# Patient Record
Sex: MALE | Race: BLACK OR AFRICAN AMERICAN
[De-identification: names, ages, dates, MRNs, and addresses within clinical notes are randomized per-mention and may not be internally consistent; named-entity substitution may affect disease eponyms.]

---

## 2018-05-15 ENCOUNTER — HOSPITAL ENCOUNTER (EMERGENCY)
Dept: HOSPITAL 62 - ER | Age: 23
Discharge: HOME | End: 2018-05-15
Payer: MEDICAID

## 2018-05-15 VITALS — SYSTOLIC BLOOD PRESSURE: 114 MMHG | DIASTOLIC BLOOD PRESSURE: 59 MMHG

## 2018-05-15 DIAGNOSIS — R11.0: ICD-10-CM

## 2018-05-15 DIAGNOSIS — R10.13: Primary | ICD-10-CM

## 2018-05-15 DIAGNOSIS — R10.33: ICD-10-CM

## 2018-05-15 LAB
ADD MANUAL DIFF: NO
ALBUMIN SERPL-MCNC: 4.4 G/DL (ref 3.5–5)
ALP SERPL-CCNC: 117 U/L (ref 38–126)
ALT SERPL-CCNC: 25 U/L (ref 21–72)
ANION GAP SERPL CALC-SCNC: 14 MMOL/L (ref 5–19)
APPEARANCE UR: CLEAR
APTT PPP: YELLOW S
AST SERPL-CCNC: 22 U/L (ref 17–59)
BASOPHILS # BLD AUTO: 0.1 10^3/UL (ref 0–0.2)
BASOPHILS NFR BLD AUTO: 1.1 % (ref 0–2)
BILIRUB DIRECT SERPL-MCNC: 0.2 MG/DL (ref 0–0.4)
BILIRUB SERPL-MCNC: 0.2 MG/DL (ref 0.2–1.3)
BILIRUB UR QL STRIP: NEGATIVE
BUN SERPL-MCNC: 10 MG/DL (ref 7–20)
CALCIUM: 9.7 MG/DL (ref 8.4–10.2)
CHLORIDE SERPL-SCNC: 104 MMOL/L (ref 98–107)
CO2 SERPL-SCNC: 27 MMOL/L (ref 22–30)
EOSINOPHIL # BLD AUTO: 0.1 10^3/UL (ref 0–0.6)
EOSINOPHIL NFR BLD AUTO: 2.7 % (ref 0–6)
ERYTHROCYTE [DISTWIDTH] IN BLOOD BY AUTOMATED COUNT: 13.2 % (ref 11.5–14)
GLUCOSE SERPL-MCNC: 85 MG/DL (ref 75–110)
GLUCOSE UR STRIP-MCNC: NEGATIVE MG/DL
HCT VFR BLD CALC: 45.5 % (ref 37.9–51)
HGB BLD-MCNC: 15.2 G/DL (ref 13.5–17)
KETONES UR STRIP-MCNC: NEGATIVE MG/DL
LIPASE SERPL-CCNC: 65.6 U/L (ref 23–300)
LYMPHOCYTES # BLD AUTO: 2.3 10^3/UL (ref 0.5–4.7)
LYMPHOCYTES NFR BLD AUTO: 43.3 % (ref 13–45)
MCH RBC QN AUTO: 25.9 PG (ref 27–33.4)
MCHC RBC AUTO-ENTMCNC: 33.4 G/DL (ref 32–36)
MCV RBC AUTO: 78 FL (ref 80–97)
MONOCYTES # BLD AUTO: 0.4 10^3/UL (ref 0.1–1.4)
MONOCYTES NFR BLD AUTO: 7 % (ref 3–13)
NEUTROPHILS # BLD AUTO: 2.4 10^3/UL (ref 1.7–8.2)
NEUTS SEG NFR BLD AUTO: 45.9 % (ref 42–78)
NITRITE UR QL STRIP: NEGATIVE
PH UR STRIP: 7 [PH] (ref 5–9)
PLATELET # BLD: 245 10^3/UL (ref 150–450)
POTASSIUM SERPL-SCNC: 3.8 MMOL/L (ref 3.6–5)
PROT SERPL-MCNC: 7.3 G/DL (ref 6.3–8.2)
PROT UR STRIP-MCNC: NEGATIVE MG/DL
RBC # BLD AUTO: 5.87 10^6/UL (ref 4.35–5.55)
SODIUM SERPL-SCNC: 145.1 MMOL/L (ref 137–145)
SP GR UR STRIP: 1.02
TOTAL CELLS COUNTED % (AUTO): 100 %
UROBILINOGEN UR-MCNC: 2 MG/DL (ref ?–2)
WBC # BLD AUTO: 5.3 10^3/UL (ref 4–10.5)

## 2018-05-15 PROCEDURE — 81001 URINALYSIS AUTO W/SCOPE: CPT

## 2018-05-15 PROCEDURE — 93005 ELECTROCARDIOGRAM TRACING: CPT

## 2018-05-15 PROCEDURE — 99284 EMERGENCY DEPT VISIT MOD MDM: CPT

## 2018-05-15 PROCEDURE — S0028 INJECTION, FAMOTIDINE, 20 MG: HCPCS

## 2018-05-15 PROCEDURE — 85025 COMPLETE CBC W/AUTO DIFF WBC: CPT

## 2018-05-15 PROCEDURE — 80053 COMPREHEN METABOLIC PANEL: CPT

## 2018-05-15 PROCEDURE — 96361 HYDRATE IV INFUSION ADD-ON: CPT

## 2018-05-15 PROCEDURE — 96375 TX/PRO/DX INJ NEW DRUG ADDON: CPT

## 2018-05-15 PROCEDURE — 36415 COLL VENOUS BLD VENIPUNCTURE: CPT

## 2018-05-15 PROCEDURE — 83690 ASSAY OF LIPASE: CPT

## 2018-05-15 PROCEDURE — 93010 ELECTROCARDIOGRAM REPORT: CPT

## 2018-05-15 PROCEDURE — 96374 THER/PROPH/DIAG INJ IV PUSH: CPT

## 2018-05-15 NOTE — ER DOCUMENT REPORT
ED GI/





- General


Chief Complaint: Abdominal Pain


Stated Complaint: ABDOMINAL PAIN


Time Seen by Provider: 05/15/18 14:33


Notes: 


The patient is a 23-year-old male who presents with 2 days of worsening 

abdominal pain and nausea.  Initially said the pain started periumbilical and 

is now in his epigastric region.  He is also having substernal chest pain that 

feels like a burning sensation.  Only surgery is an inguinal hernia repair. He 

denies vomiting, diarrhea, constipation, urinary symptoms, flank pain, 

shortness of breath or abdominal distention.


TRAVEL OUTSIDE OF THE [place] IN LAST 30 DAYS: No





- Related Data


Allergies/Adverse Reactions: 


 





No Known Allergies Allergy (Unverified 05/15/18 16:07)


 











Past Medical History





- General


Information source: Patient





- Social History


Smoking Status: Unknown if Ever Smoked


Family History: Reviewed & Not Pertinent





Review of Systems





- Review of Systems


Notes: 


REVIEW OF SYSTEMS:


CONSTITUTIONAL: -fevers, -chills


EENT: -eye pain, -difficulty swallowing, -nasal congestion


CARDIOVASCULAR: +substernal chest burning, -syncope.


RESPIRATORY: -cough, -SOB


GASTROINTESTINAL: +abdominal pain, +nausea, -vomiting, -diarrhea


GENITOURINARY: -dysuria, -hematuria


MUSCULOSKELETAL: -back pain, -neck pain


SKIN: -rash or skin lesions.


HEMATOLOGIC: -easy bruising or bleeding.


LYMPHATIC: -swollen, enlarged glands.


NEUROLOGICAL: -altered mental status or loss of consciousness, -headache, -

neurologic symptoms


PSYCHIATRIC: -anxiety, -depression.


ALL OTHER SYSTEMS REVIEWED AND NEGATIVE.





Physical Exam





- Vital signs


Vitals: 


 











Temp Pulse BP Pulse Ox


 


 97.8 F   68   122/68   97 


 


 05/15/18 14:34  05/15/18 14:34  05/15/18 14:34  05/15/18 14:34














- Notes


Notes: 


PHYSICAL EXAMINATION:


GENERAL: Well-appearing, well-nourished and in no acute distress.


HEAD: Atraumatic, normocephalic.


EYES: Pupils equal round and reactive to light, extraocular movements intact, 

sclera anicteric, conjunctiva are normal.


ENT: nares patent, oropharynx clear without exudates.  Moist mucous membranes.


NECK: Normal range of motion, supple without lymphadenopathy


LUNGS: Breath sounds clear to auscultation bilaterally and equal.  No wheezes 

rales or rhonchi.


HEART: Regular rate and rhythm without murmurs


ABDOMEN: Soft, mild periumbilical and epigastric tenderness, normoactive bowel 

sounds.  No guarding, no rebound.  No masses appreciated.


EXTREMITIES: Normal range of motion, no pitting or edema.  No cyanosis.


NEUROLOGICAL: Cranial nerves grossly intact.  Normal speech, normal gait.  

Normal sensory and motor exams.


PSYCH: Normal mood, normal affect.


SKIN: Warm, Dry, normal turgor, no rashes or lesions noted.





Course





- Re-evaluation


Re-evalutation: 


Patient with epigastric pain.  He has an inconsistent exam and had mild RLQ 

tenderness initially, so concerned about appendicitis.  However, before he 

obtained his CT scan, he said he was feeling much better and he deferred the CT 

scan at this time.  Repeat abdominal exam is completely nontender and he feels 

much better.  Blood work is also unremarkable.  Given very strict return 

precautions, especially about appendicitis, and he understands.





- Vital Signs


Vital signs: 


 











Temp Pulse Resp BP Pulse Ox


 


 97.9 F   63   18   114/59 L  99 


 


 05/15/18 17:13  05/15/18 17:13  05/15/18 17:13  05/15/18 17:13  05/15/18 17:13














- Laboratory


Result Diagrams: 


 05/15/18 14:47





 05/15/18 14:47


Laboratory results interpreted by me: 


 











  05/15/18 05/15/18 05/15/18





  14:47 14:47 15:21


 


RBC  5.87 H  


 


MCV  78 L  


 


MCH  25.9 L  


 


Sodium   145.1 H 


 


Urine Urobilinogen    2.0 H














Discharge





- Discharge


Clinical Impression: 


 Epigastric abdominal pain





Condition: Stable


Disposition: HOME, SELF-CARE


Instructions:  Observation for Appendicitis ([facility])


Additional Instructions: 


ABDOMINAL PAIN:





     There are many causes of abdominal pain.  Pain can mean a serious problem 

requiring surgery (such as appendicitis). It can also be an innocent problem 

that goes away on its own (such as a viral infection).  Often, time must pass 

to determine the cause of pain.


     The physician does not feel that hospitalization is necessary, at present. 

Things may change within the next 24 hours. Call the doctor or come back for re-

examination if any problems occur, such as:


     (1) Pain that becomes more severe, steady, or becomes concentrated in one 

specific area.  Also, pain that is more severe with movement or coughing.  


     (2) Vomiting that persists or becomes more frequent.  


     (3) Blood in the vomitus, urine, or bowel movements.  Blood in the stool 

may have a tarry or black appearance.


     (4) Shaking chills or fever greater than 100 degrees F. 


     (5) The abdomen becomes more distended or swollen. 


     (6) Bowel movements cease. 


     (7) Failure to improve as expected.








NORMAL EXAM AND WORKUP:


     At this time, your examination and workup show no significant abnormality.

  No significant abnormal physical findings are noted.  All laboratory, EKG, 

and imaging (x-ray, CT scans, ultrasound) studies that were ordered show no 

significant abnormality.


     Although your examination and all studies that were ordered showed no 

significant abnormal finding, there are no examinations and no studies that are 

100% accurate.  There is always the possibility that some abnormality could 

exist and not be detected with physical examination or within the limits and 

capabilities of laboratory and other studies.


     You should return or follow up as you were instructed on your visit today 

for further evaluation if your symptoms do not resolve.








FOLLOW-UP CARE:


If you have been referred to a physician for follow-up care, call the physician

s office for an appointment as you were instructed or within the next two days.

  If you experience worsening or a significant change in your symptoms, notify 

the physician immediately or return to the Emergency Department at any time for 

re-evaluation.





Gastritis





     You have an inflammation of the stomach called gastritis.  This commonly 

causes upper abdominal pain, nausea, and vomiting.  In severe cases, bleeding 

of the stomach lining can occur.  Gastritis can be caused by bacteria or viruses

, alcohol, or stomach-irritating drugs.


     Begin with sips of clear liquids.  Take increasing amounts of fluid over 

the first 24 hours.  Then start small amounts of bland foods (such as dry toast

, applesauce, mashed potato).  Gradually resume your usual diet.


     You should take antacids every two hours until the pain has subsided.  Acid

-suppressing drugs may be prescribed as well.  Avoid aspirin, caffeine, tobacco

, and alcohol.


     If the abdominal pain worsens, or there is evidence of major bleeding in 

the stomach (such as black, tarry stool, bloody or black vomit, or 

lightheadedness), you should return immediately.  Call the doctor if you aren't 

improved in 24 to 36 hours.





Prescriptions: 


Famotidine [Pepcid 20 mg Tablet] 20 mg PO BID #12 tablet


Referrals: 


[facility] [Outside] - Follow up as needed

## 2018-05-22 ENCOUNTER — HOSPITAL ENCOUNTER (EMERGENCY)
Dept: HOSPITAL 62 - ER | Age: 23
Discharge: LEFT BEFORE DISCHARGE | End: 2018-05-22
Payer: MEDICAID

## 2018-05-22 VITALS — DIASTOLIC BLOOD PRESSURE: 71 MMHG | SYSTOLIC BLOOD PRESSURE: 123 MMHG

## 2018-05-22 DIAGNOSIS — R00.2: Primary | ICD-10-CM

## 2018-05-22 LAB
ADD MANUAL DIFF: NO
ALBUMIN SERPL-MCNC: 4.7 G/DL (ref 3.5–5)
ALP SERPL-CCNC: 102 U/L (ref 38–126)
ALT SERPL-CCNC: 25 U/L (ref 21–72)
ANION GAP SERPL CALC-SCNC: 14 MMOL/L (ref 5–19)
AST SERPL-CCNC: 29 U/L (ref 17–59)
BASOPHILS # BLD AUTO: 0.1 10^3/UL (ref 0–0.2)
BASOPHILS NFR BLD AUTO: 1.2 % (ref 0–2)
BILIRUB DIRECT SERPL-MCNC: 0.3 MG/DL (ref 0–0.4)
BILIRUB SERPL-MCNC: 0.4 MG/DL (ref 0.2–1.3)
BUN SERPL-MCNC: 12 MG/DL (ref 7–20)
CALCIUM: 9.9 MG/DL (ref 8.4–10.2)
CHLORIDE SERPL-SCNC: 108 MMOL/L (ref 98–107)
CO2 SERPL-SCNC: 24 MMOL/L (ref 22–30)
EOSINOPHIL # BLD AUTO: 0.1 10^3/UL (ref 0–0.6)
EOSINOPHIL NFR BLD AUTO: 2.7 % (ref 0–6)
ERYTHROCYTE [DISTWIDTH] IN BLOOD BY AUTOMATED COUNT: 13.7 % (ref 11.5–14)
GLUCOSE SERPL-MCNC: 91 MG/DL (ref 75–110)
HCT VFR BLD CALC: 46 % (ref 37.9–51)
HGB BLD-MCNC: 15.3 G/DL (ref 13.5–17)
LYMPHOCYTES # BLD AUTO: 2.2 10^3/UL (ref 0.5–4.7)
LYMPHOCYTES NFR BLD AUTO: 40.7 % (ref 13–45)
MCH RBC QN AUTO: 26 PG (ref 27–33.4)
MCHC RBC AUTO-ENTMCNC: 33.4 G/DL (ref 32–36)
MCV RBC AUTO: 78 FL (ref 80–97)
MONOCYTES # BLD AUTO: 0.5 10^3/UL (ref 0.1–1.4)
MONOCYTES NFR BLD AUTO: 8.9 % (ref 3–13)
NEUTROPHILS # BLD AUTO: 2.5 10^3/UL (ref 1.7–8.2)
NEUTS SEG NFR BLD AUTO: 46.5 % (ref 42–78)
PHOSPHATE SERPL-MCNC: 4.7 MG/DL (ref 2.5–4.5)
PLATELET # BLD: 226 10^3/UL (ref 150–450)
POTASSIUM SERPL-SCNC: 3.9 MMOL/L (ref 3.6–5)
PROT SERPL-MCNC: 7.7 G/DL (ref 6.3–8.2)
RBC # BLD AUTO: 5.9 10^6/UL (ref 4.35–5.55)
SODIUM SERPL-SCNC: 145.9 MMOL/L (ref 137–145)
TOTAL CELLS COUNTED % (AUTO): 100 %
WBC # BLD AUTO: 5.4 10^3/UL (ref 4–10.5)

## 2018-05-22 PROCEDURE — 85025 COMPLETE CBC W/AUTO DIFF WBC: CPT

## 2018-05-22 PROCEDURE — 93005 ELECTROCARDIOGRAM TRACING: CPT

## 2018-05-22 PROCEDURE — 80053 COMPREHEN METABOLIC PANEL: CPT

## 2018-05-22 PROCEDURE — 93010 ELECTROCARDIOGRAM REPORT: CPT

## 2018-05-22 PROCEDURE — 84100 ASSAY OF PHOSPHORUS: CPT

## 2018-05-22 PROCEDURE — 36415 COLL VENOUS BLD VENIPUNCTURE: CPT

## 2018-05-22 PROCEDURE — 99281 EMR DPT VST MAYX REQ PHY/QHP: CPT

## 2018-05-22 PROCEDURE — 71046 X-RAY EXAM CHEST 2 VIEWS: CPT

## 2018-05-22 PROCEDURE — 84443 ASSAY THYROID STIM HORMONE: CPT

## 2018-05-22 PROCEDURE — 83735 ASSAY OF MAGNESIUM: CPT

## 2018-05-22 NOTE — RADIOLOGY REPORT (SQ)
EXAM DESCRIPTION:  CHEST 2 VIEWS



COMPLETED DATE/TIME:  5/22/2018 2:16 pm



REASON FOR STUDY:  SOB palpitations



COMPARISON:  February 2008



EXAM PARAMETERS:  NUMBER OF VIEWS: two views

TECHNIQUE: Digital Frontal and Lateral radiographic views of the chest acquired.

RADIATION DOSE: NA

LIMITATIONS: none



FINDINGS:  LUNGS AND PLEURA: No opacities, masses or pneumothorax. No pleural effusion.

MEDIASTINUM AND HILAR STRUCTURES: No masses or contour abnormalities.

HEART AND VASCULAR STRUCTURES: Heart normal size.  No evidence for failure.

BONES: No acute findings.

HARDWARE: None in the chest.

OTHER: No other significant finding.



IMPRESSION:  NO ACUTE RADIOGRAPHIC FINDING IN THE CHEST.



TECHNICAL DOCUMENTATION:  JOB ID:  [id number]

 2011 [email or web address]- All Rights Reserved



Reading location - IP/workstation name: [email or web address]

## 2018-05-22 NOTE — EKG REPORT
SEVERITY:- OTHERWISE NORMAL ECG -

SINUS RHYTHM

PACS

:

Confirmed by: Jacobo Garcia MD 22-May-2018 20:36:57

## 2018-05-22 NOTE — ER DOCUMENT REPORT
ED Medical Screen (RME)





- General


Chief Complaint: Palpitations


Stated Complaint: PALPITATIONS


Time Seen by Provider: 05/22/18 13:42


Notes: 





RAPID MEDICAL EVALUATION DISCLOSURE


I have seen this patient as part of a Rapid Medical Evaluation and, if 

applicable, placed any initially appropriate orders. The patient will be seen 

and fully evaluated, including a full history and physical exam, by a provider (

in Main ED or Fast Track) when a room becomes available.





------------------------------------------------------------------





23-year-old male here with complaints of palpitations that started earlier 

today.  They are intermittent lasting a minute or 2 before resolution.  He has 

had some shortness of breath with them but no chest pain/discomfort nausea 

vomiting lightheadedness.  He does not take any medications.  No new 

medications.  He denies any excessive caffeine or energy drink intake but does 

drink "a monster drink here and there".  Denies illicit drug use and alcohol.  

No history of thyroid issues.  Denies any previous cardiac disease.  Of note, 

he does report he has not been drinking fluids appropriately and states that 

the last time he had a bottle of water was sometime several days ago.





EXAM


CTAB


RRR


TRAVEL OUTSIDE OF THE [place] IN LAST 30 DAYS: No





- Related Data


Allergies/Adverse Reactions: 


 





No Known Allergies Allergy (Verified 05/22/18 13:25)


 











Past Medical History


Renal/ Medical History: Denies: Hx Peritoneal Dialysis





Physical Exam





- Vital signs


Vitals: 





 











Temp Pulse Resp BP Pulse Ox


 


 98.3 F   74   16   115/69   98 


 


 05/22/18 13:34  05/22/18 13:34  05/22/18 13:34  05/22/18 13:34  05/22/18 13:34














Course





- Vital Signs


Vital signs: 





 











Temp Pulse Resp BP Pulse Ox


 


 98.3 F   74   16   115/69   98 


 


 05/22/18 13:34  05/22/18 13:34  05/22/18 13:34  05/22/18 13:34  05/22/18 13:34

## 2018-05-30 ENCOUNTER — HOSPITAL ENCOUNTER (EMERGENCY)
Dept: HOSPITAL 62 - ER | Age: 23
LOS: 1 days | Discharge: HOME | End: 2018-05-31
Payer: MEDICAID

## 2018-05-30 DIAGNOSIS — F17.200: ICD-10-CM

## 2018-05-30 DIAGNOSIS — R00.2: Primary | ICD-10-CM

## 2018-05-30 DIAGNOSIS — Z59.0: ICD-10-CM

## 2018-05-30 DIAGNOSIS — R42: ICD-10-CM

## 2018-05-30 DIAGNOSIS — R06.02: ICD-10-CM

## 2018-05-30 PROCEDURE — 99406 BEHAV CHNG SMOKING 3-10 MIN: CPT

## 2018-05-30 PROCEDURE — 93010 ELECTROCARDIOGRAM REPORT: CPT

## 2018-05-30 PROCEDURE — 71046 X-RAY EXAM CHEST 2 VIEWS: CPT

## 2018-05-30 PROCEDURE — 99285 EMERGENCY DEPT VISIT HI MDM: CPT

## 2018-05-30 PROCEDURE — 93005 ELECTROCARDIOGRAM TRACING: CPT

## 2018-05-30 SDOH — ECONOMIC STABILITY - HOUSING INSECURITY: HOMELESSNESS: Z59.0

## 2018-05-31 VITALS — SYSTOLIC BLOOD PRESSURE: 119 MMHG | DIASTOLIC BLOOD PRESSURE: 61 MMHG

## 2018-05-31 NOTE — ER DOCUMENT REPORT
ED General





- General


Chief Complaint: Dizziness


Stated Complaint: DIZZYNESS/SHORT OF BREATH


Time Seen by Provider: 05/31/18 01:44


Mode of Arrival: Ambulatory


Information source: Patient, [facility] Records


Notes: 





23-year-old male with no reported past medical history presents with complaint 

of palpitations that have been ongoing for 1 week.  Patient states that he has 

been experiencing intermittent palpitations with minor activity.  He denies any 

chest pain, shortness of breath.  She was seen 1 week prior to arrival and had 

a full workup.  He does admit to increased stress and recently being homeless.  

He states that his family will not help him since he made the decision to be 

with his current girlfriend.  Patient was kicked out of the homeless shelter 

yesterday for not filling out a bus voucher.  Patient denies any recent 

illnesses, fever, chills, cough.  He denies any drug use.  He does admit to 

daily tobacco use.


TRAVEL OUTSIDE OF THE [place] IN LAST 30 DAYS: No





- HPI


Onset: Last week


Onset/Duration: Intermittent


Quality of pain: No pain


Severity: None


Associated symptoms: None, Vomiting.  denies: Chest pain, Nausea, Shortness of 

breath


Exacerbated by: Movement


Relieved by: Remaining still


Similar symptoms previously: Yes


Recently seen / treated by doctor: Yes





- Related Data


Allergies/Adverse Reactions: 


 





No Known Allergies Allergy (Verified 05/22/18 13:25)


 











Past Medical History





- General


Information source: Patient





- Social History


Smoking Status: Current Every Day Smoker


Smoking Education Provided: Yes - Patient counselled regarding cessation for 4 

minutes


Frequency of alcohol use: None


Drug Abuse: None


Lives with: Homeless, Other


Family History: Reviewed & Not Pertinent


Patient has suicidal ideation: No


Patient has homicidal ideation: No





- Medical History


Medical History: Negative


Renal/ Medical History: Denies: Hx Peritoneal Dialysis





Review of Systems





- Review of Systems


Notes: 





REVIEW OF SYSTEMS:


CONSTITUTIONAL :  Denies fever,  chills, or sweats.  Denies recent illness. 

Denies weight loss, recent hospitalizations. 


EENT: Denies visula changes, eye pain.    Denies nasal or sinus congestion or 

discharge.  Denies sore throat, oral lesions, difficulty swallowing.


CARDIOVASCULAR:  Denies chest pain.   Denies lower extremity edema.


RESPIRATORY:  Denies cough, cold, or chest congestion.  Denies shortness of 

breath, difficulty breathing, or wheezing.


GASTROINTESTINAL:  Denies abdominal pain or distention.  Denies nausea, vomiting

, or diarrhea.  Denies blood in vomitus, stools, or per rectum.  Denies black, 

tarry stools.  Denies constipation.  


GENITOURINARY:  Denies difficulty urinating, painful urination, burning, 

frequency, blood in urine, or  vaginal discharge.


MUSCULOSKELETAL:  Denies back or neck pain or stiffness.  Denies joint pain or 

swelling.


SKIN:   Denies rash, lesions or sores.


HEMATOLOGIC :   Denies easy bruising or bleeding.


LYMPHATIC:  Denies swollen, enlarged glands.


NEUROLOGICAL:  Denies confusion or altered mental status.  Denies passing out 

or loss of consciousness.  Denies dizziness or lightheadedness.  Denies 

headache.  Denies weakness or paralysis or loss of use of either side.  Denies 

problems with gait or speech.  Denies sensory loss, numbness, or tingling.  

Denies seizures.


PSYCHIATRIC:  Denies anxiety or stress.  Denies depression, suicidal ideation, 

or homicidal ideation.








Physical Exam





- Vital signs


Vitals: 


 











Temp Pulse Resp BP Pulse Ox


 


 99.0 F   79   20   132/74 H  97 


 


 05/30/18 22:48  05/30/18 22:48  05/30/18 22:48  05/30/18 22:48  05/30/18 22:48














- Notes


Notes: 


PHYSICAL EXAMINATION:





GENERAL: Well-appearing, well-nourished and in no acute distress.





HEAD: Atraumatic, normocephalic.





EYES: Pupils equal round and reactive to light, extraocular movements intact, 

sclera anicteric, conjunctiva are normal.





ENT: Nares patent, oropharynx clear without exudates.  Moist mucous membranes.





NECK: Normal range of motion, supple without lymphadenopathy





LUNGS: Breath sounds clear to auscultation bilaterally and equal.  No wheezes 

rales or rhonchi.





HEART: Regular rate and rhythm without murmurs





ABDOMEN: Soft, nontender, nondistended abdomen.  No guarding, no rebound.  No 

masses appreciated.





Musculoskeletal: Normal range of motion, no pitting or edema.  No cyanosis.





NEUROLOGICAL: Cranial nerves grossly intact.  Normal speech, normal gait.  

Normal sensory, motor exams 





PSYCH: Normal mood, normal affect.





SKIN: Warm, Dry, normal turgor, no rashes or lesions noted.





Course





- Re-evaluation


Re-evalutation: 








 





Chest X-Ray  05/31/18 02:19


IMPRESSION:


 


1. No acute pulmonary process identified.


 


 








23-year-old male with no reported past medical history presents with complaint 

of palpitations that have been ongoing for 1 week.  Patient states that he has 

been experiencing intermittent palpitations with minor activity.  He denies any 

chest pain, shortness of breath.  Patient was seen 1 week prior to arrival and 

had a full workup.  He does admit to increased stress and recently being 

homeless.  Patient was seen by myself upon arrival.  Vital signs were reviewed. 

Patient is afebrile, normotensive and not hypoxic.  Patient does not appear 

toxic or dehydrated.  They are in no acute distress.  Previous medical records 

and nursing notes reviewed.  On arrival patient was placed on cardiac monitor 

and EKG was obtained which showed the patient to be in normal sinus rhythm.  

Patient provided the opportunity to ask questions, and express concerns.  

Discharge instructions discussed. Patient is agreeable with discharge home.  

Return indications explained and discussed with the patient who displays 

understanding.  Patient encouraged to return to the emergency department 

immediately with any concerns.


05/31/18 03:30


Patient reevaluated and is sleeping comfortably.  I tried several times to 

speak to him regarding his homelessness but he wakes up and then turns over and 

goes back to sleep.  His girlfriend is also sleeping in the room.  Social work 

consult placed.


06/01/18 06:49





06/01/18 06:49


After performing a Medical Screening Examination, I estimate there is LOW risk 

for RUPTURED ESOPHAGUS, PNEUMOTHORAX, PULMONARY EMBOLISM, ACUTE CORONARY 

SYNDROME, OR THORACIC AORTIC DISSECTION, thus I consider the discharge 

disposition reasonable.  I have reevaluated this patient multiple times and no 

significant life threatening changes are noted. The patient and I have 

discussed the diagnosis and risks, and we agree with discharging home with 

close follow-up. We also discussed returning to the Emergency Department 

immediately if new or worsening symptoms occur. We have discussed the symptoms 

which are most concerning (e.g., bloody sputum, worsening pain or shortness of 

breath) that necessitate immediate return.





- Vital Signs


Vital signs: 


 











Temp Pulse Resp BP Pulse Ox


 


 97.6 F   66   18   119/61   97 


 


 05/31/18 03:58  05/31/18 03:58  05/31/18 03:58  05/31/18 03:58  05/31/18 03:58














- Diagnostic Test


Radiology reviewed: Image reviewed, Reports reviewed





- EKG Interpretation by Me


EKG shows normal: Sinus rhythm


Rate: Normal


Rhythm: NSR





Discharge





- Discharge


Clinical Impression: 


 Palpitation, Homeless





Disposition: HOME, SELF-CARE


Instructions:  Palpitations (Irregular or Rapid Heartrate) ([facility])


Additional Instructions: 


Follow up with your physician tomorrow for further care or return to the ED 

IMMEDIATELY if symptoms worsen or new concerns occur. If you cannot afford to 

follow up with your primary care physician a list of low cost clinics have been 

provided at the end of your discharge papers as well.


Forms:  Elevated Blood Pressure, Smoking Cessation Education

## 2018-06-09 ENCOUNTER — HOSPITAL ENCOUNTER (EMERGENCY)
Dept: HOSPITAL 62 - ER | Age: 23
Discharge: HOME | End: 2018-06-09
Payer: SELF-PAY

## 2018-06-09 VITALS — SYSTOLIC BLOOD PRESSURE: 118 MMHG | DIASTOLIC BLOOD PRESSURE: 63 MMHG

## 2018-06-09 DIAGNOSIS — Z59.0: ICD-10-CM

## 2018-06-09 DIAGNOSIS — L29.8: ICD-10-CM

## 2018-06-09 DIAGNOSIS — R21: Primary | ICD-10-CM

## 2018-06-09 PROCEDURE — 99282 EMERGENCY DEPT VISIT SF MDM: CPT

## 2018-06-09 SDOH — ECONOMIC STABILITY - HOUSING INSECURITY: HOMELESSNESS: Z59.0

## 2018-07-11 ENCOUNTER — HOSPITAL ENCOUNTER (EMERGENCY)
Dept: HOSPITAL 62 - ER | Age: 23
Discharge: HOME | End: 2018-07-11
Payer: SELF-PAY

## 2018-07-11 VITALS — DIASTOLIC BLOOD PRESSURE: 74 MMHG | SYSTOLIC BLOOD PRESSURE: 109 MMHG

## 2018-07-11 DIAGNOSIS — L98.9: Primary | ICD-10-CM

## 2018-07-11 PROCEDURE — 99283 EMERGENCY DEPT VISIT LOW MDM: CPT

## 2018-07-11 NOTE — ER DOCUMENT REPORT
ED General





- General


Chief Complaint: Abscess


Stated Complaint: GROIN PAIN


Time Seen by Provider: 07/11/18 03:21


Mode of Arrival: Ambulatory


Information source: Patient


Notes: 





23-year-old male presents emergency department with complaints of a growth to 

his right groin area.  He states that it started 3 days ago.  Patient states 

that he has been picking at it and it has been bleeding.  He is not sure if 

this is a skin tag versus genital warts.  Patient denies any testicular pain or 

penile discharge.  Patient is not concerned about any sexually transmitted 

diseases other than genital warts.


TRAVEL OUTSIDE OF THE [place] IN LAST 30 DAYS: No





- HPI


Onset: Other - 3 days ago


Quality of pain: No pain


Severity: None


Pain Level: Denies


Associated symptoms: None


Exacerbated by: Denies


Relieved by: Denies


Similar symptoms previously: No


Recently seen / treated by doctor: No





- Related Data


Allergies/Adverse Reactions: 


 





No Known Allergies Allergy (Verified 05/22/18 13:25)


 











Past Medical History





- Social History


Smoking Status: Never Smoker


Family History: Reviewed & Not Pertinent


Patient has suicidal ideation: No


Patient has homicidal ideation: No


Renal/ Medical History: Denies: Hx Peritoneal Dialysis





Review of Systems





- Review of Systems


Constitutional: No symptoms reported


EENT: No symptoms reported


Cardiovascular: No symptoms reported


Respiratory: No symptoms reported


Gastrointestinal: No symptoms reported


Genitourinary: No symptoms reported


Male Genitourinary: No symptoms reported


Musculoskeletal: No symptoms reported


Skin: Lesions


Hematologic/Lymphatic: No symptoms reported


Neurological/Psychological: No symptoms reported


-: Yes All other systems reviewed and negative





Physical Exam





- Vital signs


Vitals: 


 











Temp Pulse Resp BP Pulse Ox


 


 98.9 F   68   16   121/70   98 


 


 07/11/18 00:51  07/11/18 00:51  07/11/18 00:51  07/11/18 00:51  07/11/18 00:51











Interpretation: Normal





- Notes


Notes: 





PHYSICAL EXAMINATION:





GENERAL: Well-appearing, well-nourished and in no acute distress.





HEAD: Atraumatic, normocephalic.





EYES: Pupils equal round and reactive to light, extraocular movements intact, 

sclera anicteric, conjunctiva are normal.





ENT: Nares patent, oropharynx clear without exudates.  Moist mucous membranes.





NECK: Normal range of motion, supple without lymphadenopathy





LUNGS: Breath sounds clear to auscultation bilaterally and equal.  No wheezes 

rales or rhonchi.





HEART: Regular rate and rhythm without murmurs





ABDOMEN: Soft, nontender, nondistended abdomen.  No guarding, no rebound.  No 

masses appreciated.





Genital: No ulcerations, penilie discharge, testicular pain. Genital wart vs 

skin tag to the R groin. No lymphadenopathy. 





Musculoskeletal: Normal range of motion, no pitting or edema.  No cyanosis.





NEUROLOGICAL: Cranial nerves grossly intact.  Normal speech, normal gait.  

Normal sensory, motor exams 





PSYCH: Normal mood, normal affect.





SKIN: Warm, Dry, normal turgor, Skin tag vs genital wart noted to the R groin 

area. No abscess appreciated. No lymphadenopathy in the groin area. 





Course





- Vital Signs


Vital signs: 


 











Temp Pulse Resp BP Pulse Ox


 


 98.9 F   68   16   121/70   98 


 


 07/11/18 00:51  07/11/18 00:51  07/11/18 00:51  07/11/18 00:51  07/11/18 00:51














Discharge





- Discharge


Clinical Impression: 


 Skin lesion





Condition: Stable


Disposition: HOME, SELF-CARE


Instructions:  Genital Warts ([facility])


Referrals: 


NICKI MUKHERJEE DO [ACTIVE STAFF] - Follow up as needed


ABHISHEK ORTIZ MD [COMMUNITY BASED STAFF] - Follow up as needed

## 2018-07-17 ENCOUNTER — HOSPITAL ENCOUNTER (EMERGENCY)
Dept: HOSPITAL 62 - ER | Age: 23
LOS: 1 days | Discharge: HOME | End: 2018-07-18
Payer: SELF-PAY

## 2018-07-17 DIAGNOSIS — F32.9: ICD-10-CM

## 2018-07-17 DIAGNOSIS — Z59.0: ICD-10-CM

## 2018-07-17 DIAGNOSIS — F17.210: ICD-10-CM

## 2018-07-17 DIAGNOSIS — Z63.0: ICD-10-CM

## 2018-07-17 DIAGNOSIS — R45.851: Primary | ICD-10-CM

## 2018-07-17 LAB
ADD MANUAL DIFF: NO
APPEARANCE UR: (no result)
APTT PPP: YELLOW S
BASOPHILS # BLD AUTO: 0.1 10^3/UL (ref 0–0.2)
BASOPHILS NFR BLD AUTO: 0.9 % (ref 0–2)
BILIRUB UR QL STRIP: NEGATIVE
EOSINOPHIL # BLD AUTO: 0.1 10^3/UL (ref 0–0.6)
EOSINOPHIL NFR BLD AUTO: 1.3 % (ref 0–6)
ERYTHROCYTE [DISTWIDTH] IN BLOOD BY AUTOMATED COUNT: 13 % (ref 11.5–14)
GLUCOSE UR STRIP-MCNC: NEGATIVE MG/DL
HCT VFR BLD CALC: 40.5 % (ref 37.9–51)
HGB BLD-MCNC: 13.7 G/DL (ref 13.5–17)
KETONES UR STRIP-MCNC: NEGATIVE MG/DL
LYMPHOCYTES # BLD AUTO: 1.7 10^3/UL (ref 0.5–4.7)
LYMPHOCYTES NFR BLD AUTO: 24.2 % (ref 13–45)
MCH RBC QN AUTO: 26.2 PG (ref 27–33.4)
MCHC RBC AUTO-ENTMCNC: 33.8 G/DL (ref 32–36)
MCV RBC AUTO: 77 FL (ref 80–97)
MONOCYTES # BLD AUTO: 0.5 10^3/UL (ref 0.1–1.4)
MONOCYTES NFR BLD AUTO: 7.9 % (ref 3–13)
NEUTROPHILS # BLD AUTO: 4.5 10^3/UL (ref 1.7–8.2)
NEUTS SEG NFR BLD AUTO: 65.7 % (ref 42–78)
NITRITE UR QL STRIP: NEGATIVE
PH UR STRIP: 5 [PH] (ref 5–9)
PLATELET # BLD: 249 10^3/UL (ref 150–450)
PROT UR STRIP-MCNC: NEGATIVE MG/DL
RBC # BLD AUTO: 5.24 10^6/UL (ref 4.35–5.55)
SP GR UR STRIP: 1.02
TOTAL CELLS COUNTED % (AUTO): 100 %
UROBILINOGEN UR-MCNC: 2 MG/DL (ref ?–2)
WBC # BLD AUTO: 6.9 10^3/UL (ref 4–10.5)

## 2018-07-17 PROCEDURE — 81001 URINALYSIS AUTO W/SCOPE: CPT

## 2018-07-17 PROCEDURE — 80307 DRUG TEST PRSMV CHEM ANLYZR: CPT

## 2018-07-17 PROCEDURE — 93010 ELECTROCARDIOGRAM REPORT: CPT

## 2018-07-17 PROCEDURE — 85025 COMPLETE CBC W/AUTO DIFF WBC: CPT

## 2018-07-17 PROCEDURE — 80053 COMPREHEN METABOLIC PANEL: CPT

## 2018-07-17 PROCEDURE — 93005 ELECTROCARDIOGRAM TRACING: CPT

## 2018-07-17 PROCEDURE — 99285 EMERGENCY DEPT VISIT HI MDM: CPT

## 2018-07-17 PROCEDURE — 36415 COLL VENOUS BLD VENIPUNCTURE: CPT

## 2018-07-17 SDOH — SOCIAL STABILITY - SOCIAL INSECURITY: PROBLEMS IN RELATIONSHIP WITH SPOUSE OR PARTNER: Z63.0

## 2018-07-17 SDOH — ECONOMIC STABILITY - HOUSING INSECURITY: HOMELESSNESS: Z59.0

## 2018-07-17 NOTE — ER DOCUMENT REPORT
ED General





- General


Chief Complaint: Psych Problem


Stated Complaint: IVC WITH PAPERS


Time Seen by Provider: 07/17/18 23:20


Mode of Arrival: Medic


Information source: Patient, Law [employer]


Notes: 





23-year-old male who is homeless presents with suicidal ideation.  Patient is 

under IVC paperwork, it is noted that the patient got into argument with his 

fiance, there is supposed to get  in 2 days, he was accusing her of 

cheating on him, patient notes that she tried to break up with him he got 

frustrated and threatened that he was going to hurt himself states that his 

plan was to jump off the bridge.  Patient notes that this was a irrational 

thought that he does not actually want to hurt himself that he plans on moving 

to [place] with her and plans to buy a wedding just on Friday.  Patient 

currently denies any complaints states he has never had suicidal ideations 

before


TRAVEL OUTSIDE OF THE [place] IN LAST 30 DAYS: No





- HPI


Onset: Just prior to arrival


Onset/Duration: Sudden


Quality of pain: No pain


Severity: Mild


Pain Level: Denies


Associated symptoms: Other


Exacerbated by: Denies


Relieved by: Denies


Similar symptoms previously: No


Recently seen / treated by doctor: No





- Related Data


Allergies/Adverse Reactions: 


 





No Known Allergies Allergy (Verified 05/22/18 13:25)


 











Past Medical History





- Social History


Smoking Status: Current Every Day Smoker


Cigarette use (# per day): Yes


Chew tobacco use (# tins/day): No


Smoking Education Provided: No


Frequency of alcohol use: Occasional


Drug Abuse: None


Family History: Reviewed & Not Pertinent


Patient has suicidal ideation: Yes


Patient has homicidal ideation: No


Renal/ Medical History: Denies: Hx Peritoneal Dialysis


Past Surgical History: Reports: Hx Abdominal Surgery - hernia





Review of Systems





- Review of Systems


Notes: 





REVIEW OF SYSTEMS:


CONSTITUTIONAL :  Denies fever,  chills, or sweats.  Denies recent illness.


EENT:   Denies eye, ear, throat, or mouth pain or symptoms.  Denies nasal or 

sinus congestion or discharge.  Denies throat, tongue, or mouth swelling or 

difficulty swallowing.


CARDIOVASCULAR:  Denies chest pain.  Denies palpitations or racing or irregular 

heart beat.  Denies ankle edema.


RESPIRATORY:  Denies cough, cold, or chest congestion.  Denies shortness of 

breath, difficulty breathing, or wheezing.


GASTROINTESTINAL:  Denies abdominal pain or distention.  Denies nausea, vomiting

, or diarrhea.  Denies blood in vomitus, stools, or per rectum.  Denies black, 

tarry stools.  Denies constipation.  


GENITOURINARY:  Denies difficulty urinating, painful urination, burning, 

frequency, blood in urine, or discharge.


MUSCULOSKELETAL:  Denies back or neck pain or stiffness.  Denies joint pain or 

swelling.


SKIN:   Denies rash, lesions or sores.


HEMATOLOGIC :   Denies easy bruising or bleeding.


LYMPHATIC:  Denies swollen, enlarged glands.


NEUROLOGICAL:  Denies confusion or altered mental status.  Denies passing out 

or loss of consciousness.  Denies dizziness or lightheadedness.  Denies 

headache.  Denies weakness or paralysis or loss of use of either side.  Denies 

problems with gait or speech.  Denies sensory loss, numbness, or tingling.  

Denies seizures.


PSYCHIATRIC:  Denies anxiety or stress.  Denies depression, suicidal ideation, 

or homicidal ideation.





ALL OTHER SYSTEMS REVIEWED AND NEGATIVE.





Dictation was performed using Dragon voice recognition software 





PHYSICAL EXAMINATION:





GENERAL: Well-appearing, well-nourished and in no acute distress.





HEAD: Atraumatic, normocephalic.





EYES: Pupils equal round and reactive to light, extraocular movements intact, 

sclera anicteric, conjunctiva are normal.





ENT: Nares patent, oropharynx clear without exudates.  Moist mucous membranes.





NECK: Normal range of motion, supple without lymphadenopathy





LUNGS: Breath sounds clear to auscultation bilaterally and equal.  No wheezes 

rales or rhonchi.





HEART: Regular rate and rhythm without murmurs





ABDOMEN: Soft, nontender, nondistended abdomen.  No guarding, no rebound.  No 

masses appreciated.





Musculoskeletal: Normal range of motion, no pitting or edema.  No cyanosis.





NEUROLOGICAL: Cranial nerves grossly intact.  Normal speech, normal gait.  

Normal sensory, motor exams 





PSYCH: Normal mood, normal affect.





SKIN: Warm, Dry, normal turgor, no rashes or lesions noted.





Course





- Re-evaluation


Re-evalutation: 





07/17/18 23:57


Currently patient denies any complaints, I have low suspicion of self-harm, I 

will have mental health evaluate the patient nonetheless in the morning





- Laboratory


Result Diagrams: 


 07/17/18 23:30





 07/17/18 23:30


Laboratory results interpreted by me: 





 











  07/17/18 07/17/18





  23:10 23:30


 


MCV   77 L


 


MCH   26.2 L


 


Urine Urobilinogen  2.0 H 














Discharge





- Discharge


Clinical Impression: 


 Suicidal ideation





Condition: Stable


Disposition: PSYCH HOSP/UNIT

## 2018-07-18 VITALS — SYSTOLIC BLOOD PRESSURE: 122 MMHG | DIASTOLIC BLOOD PRESSURE: 83 MMHG

## 2018-07-18 LAB
ALBUMIN SERPL-MCNC: 4.4 G/DL (ref 3.5–5)
ALP SERPL-CCNC: 118 U/L (ref 38–126)
ALT SERPL-CCNC: 25 U/L (ref 21–72)
ANION GAP SERPL CALC-SCNC: 14 MMOL/L (ref 5–19)
APAP SERPL-MCNC: < 10 UG/ML (ref 10–30)
AST SERPL-CCNC: 29 U/L (ref 17–59)
BARBITURATES UR QL SCN: NEGATIVE
BILIRUB DIRECT SERPL-MCNC: 0.2 MG/DL (ref 0–0.4)
BILIRUB SERPL-MCNC: 0.4 MG/DL (ref 0.2–1.3)
BUN SERPL-MCNC: 5 MG/DL (ref 7–20)
CALCIUM: 9.6 MG/DL (ref 8.4–10.2)
CHLORIDE SERPL-SCNC: 105 MMOL/L (ref 98–107)
CO2 SERPL-SCNC: 25 MMOL/L (ref 22–30)
ETHANOL SERPL-MCNC: < 10 MG/DL
GLUCOSE SERPL-MCNC: 113 MG/DL (ref 75–110)
METHADONE UR QL SCN: NEGATIVE
PCP UR QL SCN: NEGATIVE
POTASSIUM SERPL-SCNC: 3.7 MMOL/L (ref 3.6–5)
PROT SERPL-MCNC: 7.3 G/DL (ref 6.3–8.2)
SALICYLATES SERPL-MCNC: < 1 MG/DL (ref 2–20)
SODIUM SERPL-SCNC: 144.3 MMOL/L (ref 137–145)
URINE AMPHETAMINES SCREEN: NEGATIVE
URINE BENZODIAZEPINES SCREEN: NEGATIVE
URINE COCAINE SCREEN: NEGATIVE
URINE MARIJUANA (THC) SCREEN: NEGATIVE

## 2018-07-18 NOTE — PSYCHOLOGICAL NOTE
Psych Note





- Psych Note


Psych Note: 


Reason for Consult: suicidal ideation


Consent permissions: none given


Collateral: [facility], [facility], [phone]





pt brought to room 46 by [facility] on ivc papers. pt appears calm, cooperative and 

denies SI/HI at this time, states he has plans to get  and leave for new 

[place] in 2 days with girlfriend.  states earlier today him and his girlfriend 

broke up and he made a "rash" decision earlier and made statements to jump over 

bridge.  [facility] had taken papers out on pt. 





Patient reports that he got into an argument with girlfriend and "took some 

space."  He reports that that needs to be done however then she stated that she 

wanted to break up.  He disclosed that he became very upset and "I said I would 

jump."  He confirms that he told mobile crisis worker that he jump off the 

bridge in that because he could not swim if the job did not kill him he would 

drowned because he was unable to swim.  He states he said this because he was 

so upset and felt really bad;however, denies going through with his idea "I did 

not do it because the hope of getting back together with my girlfriend."  He 

disclosed that he was speaking with mobile crisis worker while he was trying to 

walk back to the homeless shelter so he could speak with his girlfriend.  

Patient admits that he "ran away" from the mobile crisis worker to talk with 

his girlfriend.  He disclosed that he is now back together with his girlfriend 

and he was walking to breakfast with her when [facility] found him and brought him to 

the hospital.  Patient reports wanting to move to [place] tomorrow with his 

girlfriend so they can get .  He reports they have no plans of returning 

to [place] because "we want to live there."





Clinician unsuccessfully attempted contact [facility]





Patient is alert and orientated to person, place, time and circumstance.  Mood 

is euthymic with congruent affect as evidenced by smiling and openly engaging 

with clinician.  Patient denies current suicidal and homicidal ideation.  

Delusions are absent behaviors congruent with an intact reality based 

presentation i.e. organized and linear thought processes.  Eye contact was well-

maintained.  Conversational speech was within normal rate, tone and prosody.  

Intellectual abilities appear to be the low average range.  Attention and 

concentration are fair.  Insight, judgment, impulse control are fair.





No medication recommendations at this time





Diagnosis


V61.10 (Z63.0) relationship to stress with intimate partner


V60.0 (Z59.0) homelessness


311 (F32.9) unspecified depressive disorder; situational





Impression\plan: Patient is recommended for rescind of IVC and is cleared from 

acute psychiatric services.  Patient no longer meets IVC criteria per [place] [facility] 

122C.  Patient denies current suicidal ideation openly engages with clinician 

about his depressed thoughts last night during his brief breakup with his 

girlfriend.  Patient denies intent to follow through with his original plan 

because "the hope of getting back together."  Patient is recommended to follow-

up with outpatient mental health services if he chooses to stay in the local 

area he has been provided a local resource list of providers.  Dr. Castellon was 

consulted and the care management this patient; attending physician is 

agreement with recommendations and disposition. Pt returned from x-ray.  Denies pain at the moment bc pt is laying in bed.  C/o 5/10 to rt foot/ankle with wt bearing and 0/10 with non-wt bearing.   + pulse,  +brisk cap refill.  Pt able to move foot and ankle without c/o pain.

## 2018-07-18 NOTE — ER DOCUMENT REPORT
Doctor's Note


Notes: 





07/18/18 10:10


Rounds: Chart reviewed and patient interviewed.  Patient has been evaluated for 

suicidal thoughts.  He is homeless in this area with fianc.  He says he no 

longer feels suicidal and thinks he made a mistake.  He wishes to be discharged 

so that he and his fiance can catch a bus to [place].  Vital signs are all 

normal.  Lab studies were normal.  Patient appears to be medically stable for 

transfer or discharge.


TEENA Castro MD

## 2018-08-05 ENCOUNTER — HOSPITAL ENCOUNTER (EMERGENCY)
Dept: HOSPITAL 62 - ER | Age: 23
Discharge: HOME | End: 2018-08-05
Payer: SELF-PAY

## 2018-08-05 VITALS — DIASTOLIC BLOOD PRESSURE: 72 MMHG | SYSTOLIC BLOOD PRESSURE: 99 MMHG

## 2018-08-05 DIAGNOSIS — R10.84: Primary | ICD-10-CM

## 2018-08-05 DIAGNOSIS — Z63.0: ICD-10-CM

## 2018-08-05 DIAGNOSIS — R11.0: ICD-10-CM

## 2018-08-05 DIAGNOSIS — Z59.0: ICD-10-CM

## 2018-08-05 DIAGNOSIS — F17.200: ICD-10-CM

## 2018-08-05 LAB
ADD MANUAL DIFF: NO
ALBUMIN SERPL-MCNC: 4.7 G/DL (ref 3.5–5)
ALP SERPL-CCNC: 114 U/L (ref 38–126)
ALT SERPL-CCNC: 27 U/L (ref 21–72)
ANION GAP SERPL CALC-SCNC: 15 MMOL/L (ref 5–19)
APAP SERPL-MCNC: < 10 UG/ML (ref 10–30)
AST SERPL-CCNC: 27 U/L (ref 17–59)
BASOPHILS # BLD AUTO: 0.1 10^3/UL (ref 0–0.2)
BASOPHILS NFR BLD AUTO: 0.8 % (ref 0–2)
BILIRUB DIRECT SERPL-MCNC: 0.3 MG/DL (ref 0–0.4)
BILIRUB SERPL-MCNC: 0.5 MG/DL (ref 0.2–1.3)
BUN SERPL-MCNC: 8 MG/DL (ref 7–20)
CALCIUM: 10 MG/DL (ref 8.4–10.2)
CHLORIDE SERPL-SCNC: 102 MMOL/L (ref 98–107)
CO2 SERPL-SCNC: 28 MMOL/L (ref 22–30)
EOSINOPHIL # BLD AUTO: 0.1 10^3/UL (ref 0–0.6)
EOSINOPHIL NFR BLD AUTO: 1.8 % (ref 0–6)
ERYTHROCYTE [DISTWIDTH] IN BLOOD BY AUTOMATED COUNT: 13.2 % (ref 11.5–14)
ETHANOL SERPL-MCNC: < 10 MG/DL
GLUCOSE SERPL-MCNC: 94 MG/DL (ref 75–110)
HCT VFR BLD CALC: 45.5 % (ref 37.9–51)
HGB BLD-MCNC: 15.2 G/DL (ref 13.5–17)
LYMPHOCYTES # BLD AUTO: 2.1 10^3/UL (ref 0.5–4.7)
LYMPHOCYTES NFR BLD AUTO: 31.8 % (ref 13–45)
MCH RBC QN AUTO: 26 PG (ref 27–33.4)
MCHC RBC AUTO-ENTMCNC: 33.3 G/DL (ref 32–36)
MCV RBC AUTO: 78 FL (ref 80–97)
MONOCYTES # BLD AUTO: 0.5 10^3/UL (ref 0.1–1.4)
MONOCYTES NFR BLD AUTO: 7.9 % (ref 3–13)
NEUTROPHILS # BLD AUTO: 3.7 10^3/UL (ref 1.7–8.2)
NEUTS SEG NFR BLD AUTO: 57.7 % (ref 42–78)
PLATELET # BLD: 292 10^3/UL (ref 150–450)
POTASSIUM SERPL-SCNC: 4.8 MMOL/L (ref 3.6–5)
PROT SERPL-MCNC: 8.2 G/DL (ref 6.3–8.2)
RBC # BLD AUTO: 5.83 10^6/UL (ref 4.35–5.55)
SALICYLATES SERPL-MCNC: < 1 MG/DL (ref 2–20)
SODIUM SERPL-SCNC: 145.3 MMOL/L (ref 137–145)
TOTAL CELLS COUNTED % (AUTO): 100 %
WBC # BLD AUTO: 6.5 10^3/UL (ref 4–10.5)

## 2018-08-05 PROCEDURE — 80053 COMPREHEN METABOLIC PANEL: CPT

## 2018-08-05 PROCEDURE — 85025 COMPLETE CBC W/AUTO DIFF WBC: CPT

## 2018-08-05 PROCEDURE — 36415 COLL VENOUS BLD VENIPUNCTURE: CPT

## 2018-08-05 PROCEDURE — 99284 EMERGENCY DEPT VISIT MOD MDM: CPT

## 2018-08-05 PROCEDURE — 80307 DRUG TEST PRSMV CHEM ANLYZR: CPT

## 2018-08-05 SDOH — SOCIAL STABILITY - SOCIAL INSECURITY: PROBLEMS IN RELATIONSHIP WITH SPOUSE OR PARTNER: Z63.0

## 2018-08-05 SDOH — ECONOMIC STABILITY - HOUSING INSECURITY: HOMELESSNESS: Z59.0

## 2018-08-05 NOTE — ER DOCUMENT REPORT
ED General





- General


Chief Complaint: Depression


Stated Complaint: STOMACH PAIN


Time Seen by Provider: 08/05/18 06:53


TRAVEL OUTSIDE OF THE [place] IN LAST 30 DAYS: No





- HPI


Notes: 





23-year-old male presents with "broken heart" and abdominal pain.  Patient 

states he broke up with a significant other yesterday and since then has had a 

broken heart developed gradual onset diffuse crampy achy abdominal pain.  

Nausea but no vomiting.  He is homeless but is been sleeping with a friend.  No 

fever, chills or sweats.  Achy mid crampy abdominal pain, poorly localized.  No 

diarrhea.  No other modifying factors, no other associated symptoms, no other 

provocative or palliative factors.





- Related Data


Allergies/Adverse Reactions: 


 





No Known Allergies Allergy (Verified 05/22/18 13:25)


 











Past Medical History





- Social History


Smoking Status: Current Every Day Smoker


Chew tobacco use (# tins/day): No


Frequency of alcohol use: None


Drug Abuse: None


Family History: Reviewed & Not Pertinent


Patient has suicidal ideation: No


Patient has homicidal ideation: No


Renal/ Medical History: Denies: Hx Peritoneal Dialysis


Psychiatric Medical History: Reports: Hx Depression


Past Surgical History: Reports: Hx Abdominal Surgery - hernia





Review of Systems





- Review of Systems


Notes: 





Review of systems as in the history of present illness, otherwise negative x 10 

systems.





Physical Exam





- Vital signs


Vitals: 





 











Temp Pulse Resp BP Pulse Ox


 


 98.8 F   89   19   117/73   95 


 


 08/05/18 03:08  08/05/18 03:08  08/05/18 03:08  08/05/18 03:08  08/05/18 03:08














- Notes


Notes: 





General:  Well developed .


HEENT:  Normocephalic, atraumatic. Pupils equal round reactive to light.  No 

JVD.


Chest: No trauma.


Respiratory: Good air exchange, normal excursion.


Cardiac: Regular rhythm. No murmurs or gallops.


Abdomen: Soft, benign. Nondistended. Nontender.


Back: No asymmetry or gross abnormality.


Motor: Grossly normal power and tone.


Neurologic: Alert, nonfocal. Cranial nerves II-12 are intact. Sensation intact.


Vascular: Well perfused. Normal peripheral pulses.


Skin: No petechiae or purpura.








Course





- Re-evaluation


Re-evalutation: 





08/05/18 07:34


On initial examination, the patient is sleeping comfortably, when I press 

deeply and he is sleeping I am unable to elicit any pain.  On direct 

examination he is nontender.





Part of my evaluation, protocol labs were initiated.  Review of labs show 

normal CBC, chemistries LFTs and lipase.





Patient is resting comfortably.  He is neither suicidal nor homicidal.  Has no 

psychotic features.  This point I am comfortable with discharge home, he is 

asked to follow with his primary care physician.  He is tolerating p.o. intake, 

follow-up as discussed.





- Vital Signs


Vital signs: 





 











Temp Pulse Resp BP Pulse Ox


 


 98.1 F   89   19   117/73   95 


 


 08/05/18 07:00  08/05/18 03:08  08/05/18 03:08  08/05/18 03:08  08/05/18 03:08














- Laboratory


Result Diagrams: 


 08/05/18 05:05





 08/05/18 05:05


Laboratory results interpreted by me: 





 











  08/05/18 08/05/18





  05:05 05:05


 


RBC  5.83 H 


 


MCV  78 L 


 


MCH  26.0 L 


 


Sodium   145.3 H


 


Salicylates   < 1.0 L


 


Acetaminophen   < 10 L














Discharge





- Discharge


Clinical Impression: 


Abdominal pain


Qualifiers:


 Abdominal location: generalized Qualified Code(s): R10.84 - Generalized 

abdominal pain





Condition: Good


Disposition: HOME, SELF-CARE


Instructions:  Abdominal Pain ([facility])